# Patient Record
Sex: FEMALE | Race: OTHER | Employment: FULL TIME | RURAL
[De-identification: names, ages, dates, MRNs, and addresses within clinical notes are randomized per-mention and may not be internally consistent; named-entity substitution may affect disease eponyms.]

---

## 2020-08-13 ENCOUNTER — OFFICE VISIT (OUTPATIENT)
Dept: PRIMARY CARE CLINIC | Age: 25
End: 2020-08-13

## 2020-08-13 DIAGNOSIS — Z20.828 EXPOSURE TO SARS-ASSOCIATED CORONAVIRUS: Primary | ICD-10-CM

## 2020-08-15 LAB — SARS-COV-2, NAA: NOT DETECTED

## 2020-08-31 PROBLEM — Z87.39 H/O KAWASAKI'S DISEASE: Status: ACTIVE | Noted: 2017-08-01

## 2020-08-31 PROBLEM — J45.20 MILD INTERMITTENT ASTHMA WITHOUT COMPLICATION: Status: ACTIVE | Noted: 2020-08-31

## 2020-08-31 PROBLEM — Z80.8 FAMILY HISTORY OF THYROID CANCER: Status: ACTIVE | Noted: 2019-06-27

## 2020-08-31 PROBLEM — J30.9 ALLERGIC RHINITIS: Status: ACTIVE | Noted: 2017-08-01

## 2020-08-31 PROBLEM — E66.01 MORBID OBESITY (HCC): Status: ACTIVE | Noted: 2018-11-21

## 2020-08-31 PROBLEM — F41.1 GAD (GENERALIZED ANXIETY DISORDER): Status: ACTIVE | Noted: 2020-08-31

## 2021-06-22 ENCOUNTER — OFFICE VISIT (OUTPATIENT)
Dept: FAMILY MEDICINE CLINIC | Age: 26
End: 2021-06-22
Payer: COMMERCIAL

## 2021-06-22 VITALS
TEMPERATURE: 98.1 F | OXYGEN SATURATION: 98 % | BODY MASS INDEX: 46.42 KG/M2 | WEIGHT: 278.6 LBS | DIASTOLIC BLOOD PRESSURE: 80 MMHG | HEART RATE: 93 BPM | RESPIRATION RATE: 20 BRPM | HEIGHT: 65 IN | SYSTOLIC BLOOD PRESSURE: 138 MMHG

## 2021-06-22 DIAGNOSIS — Z11.3 ROUTINE SCREENING FOR STI (SEXUALLY TRANSMITTED INFECTION): ICD-10-CM

## 2021-06-22 DIAGNOSIS — Z11.59 SCREENING FOR VIRAL DISEASE: ICD-10-CM

## 2021-06-22 DIAGNOSIS — E55.9 VITAMIN D DEFICIENCY: ICD-10-CM

## 2021-06-22 DIAGNOSIS — Z11.1 PPD SCREENING TEST: Primary | ICD-10-CM

## 2021-06-22 PROCEDURE — 36415 COLL VENOUS BLD VENIPUNCTURE: CPT | Performed by: NURSE PRACTITIONER

## 2021-06-22 PROCEDURE — 99214 OFFICE O/P EST MOD 30 MIN: CPT | Performed by: NURSE PRACTITIONER

## 2021-06-22 NOTE — PROGRESS NOTES
Chief Complaint   Patient presents with    Employment Physical       HPI:    Dayton Diop is a 22 y.o. female who presents today for chronic care. Allison Aranda is a  at [employer]. 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     Hx anxiety: Diagnosed earlier this year but Allison Aranda reports she has had anxiety symptoms for her entire life. She was started on citalopram 20 mg in March and has felt some improvement. Stressors include work, social obligations. No longer on Celexa. Some anxiety lately with the sale of her house and a pending move but tolerable.     Hx Kawasaki disease: Dx at age 11, received 2 IVIG infusions. No longer seeing cardiology. Gets an echo every 2-3 years. Last done in 2019 at [facility] in [place], [place].     Allergic asthma: Controlled on Zyrtec, PRN albuterol. Uses albuterol about every 2-3 days. New issues: Needs tuberculosis screening test for her new job today. She also requests STI screening - sexually active but asymptomatic. She is on OCPs. Allergies   Allergen Reactions    Amoxil [Amoxicillin] Hives    Bactrim [Sulfamethoprim] Rash    Ceftriaxone Hives    Pcn [Penicillins] Rash       Current Outpatient Medications   Medication Sig    Sronyx 0.1-20 mg-mcg tab TAKE 1 TABLET BY MOUTH EVERY DAY    albuterol (PROVENTIL HFA, VENTOLIN HFA, PROAIR HFA) 90 mcg/actuation inhaler Take  by inhalation.  Cetirizine (ZyrTEC) 10 mg cap Take  by mouth. No current facility-administered medications for this visit. Past Medical History:   Diagnosis Date    Allergic rhinitis 8/1/2017    Last Assessment & Plan:  I resumed her generic Singulair 10 mg daily. Stop the Allegra and start Zyrtec 10 mg 1 pill every day. Refer to ENT through [facility] for evaluation. She will keep her appointment here with March with her PCP to recheck her allergy symptoms as well.     Family history of thyroid cancer 6/27/2019    JACQUELIN (generalized anxiety disorder) 8/31/2020    H/O Kawasaki's disease 8/1/2017    Mild intermittent asthma without complication 0/70/9443       No family history on file. ROS:  Denies fever, chills, cough, chest pain, SOB,  nausea, vomiting, diarrhea, dysuria. Denies rashes, wounds, arthralgias, weakness, numbness, visual changes, depression, + wt loss, wt gain, hemoptysis, hematochezia or melena. Patient is not experiencing chest pain radiating to the jaw and/or down the arms. Physical Examination:    /80 (BP 1 Location: Right arm, BP Patient Position: Sitting, BP Cuff Size: Large adult)   Pulse 93   Temp 98.1 °F (36.7 °C) (Temporal)   Resp 20   Ht 5' 5\" (1.651 m)   Wt 278 lb 9.6 oz (126.4 kg)   SpO2 98%   BMI 46.36 kg/m²     Wt Readings from Last 3 Encounters:   06/22/21 278 lb 9.6 oz (126.4 kg)   09/25/20 286 lb (129.7 kg)   09/04/20 272 lb 3.2 oz (123.5 kg)     Constitutional: WDWN Female in no acute distress  HENT:  NC/AT  EYES: EOMI, PERRL  Neck:  Supple, no JVD, mass or bruit. No thyromegaly. Respiratory:  Respirations even and unlabored without use of accessory muscles, CTA throughout without wheezes, rales, rubs or rhonchi. Symmetrical chest expansion. Upper airway congestion. Cardiac: RRR  Musculoskeletal:  No cyanosis, clubbing or edema of extremities. Moves all extremities without difficulty. Neurologic:  Smooth, even gait without assistance, CN 2-12 grossly intact. Skin: intact and warm to the touch, no rash   Lymphadenopathy: no cervical or supraclavicular nodes  Psych: Pleasant and appropriate. Judgment normal. Alert and oriented x 3. ASSESSMENT AND PLAN:       ICD-10-CM ICD-9-CM    1. PPD screening test  Z11.1 V74.1 COLLECTION VENOUS BLOOD,VENIPUNCTURE      QUANTIFERON-TB GOLD PLUS      COLLECTION VENOUS BLOOD,VENIPUNCTURE   2.  Screening for viral disease  Z11.59 V73.99 COLLECTION VENOUS BLOOD,VENIPUNCTURE      HEP B SURFACE AB      HCV AB W/RFLX TO GENESIS      HCV AB W/RFLX TO GENESIS      HEP B SURFACE AB      COLLECTION VENOUS BLOOD,VENIPUNCTURE   3. Routine screening for STI (sexually transmitted infection)  Z11.3 V74.5 COLLECTION VENOUS BLOOD,VENIPUNCTURE      CT/NG/T.VAGINALIS AMPLIFICATION      HIV 1/2 AG/AB, 4TH GENERATION,W RFLX CONFIRM      T PALLIDUM SCREEN W/REFLEX      HEP B SURFACE AB      HCV AB W/RFLX TO GENESIS      HCV AB W/RFLX TO GENESIS      HEP B SURFACE AB      T PALLIDUM SCREEN W/REFLEX      HIV 1/2 AG/AB, 4TH GENERATION,W RFLX CONFIRM      CT/NG/T.VAGINALIS AMPLIFICATION      COLLECTION VENOUS BLOOD,VENIPUNCTURE   4. Vitamin D deficiency  E55.9 268.9 COLLECTION VENOUS BLOOD,VENIPUNCTURE      VITAMIN D, 25 HYDROXY      VITAMIN D, 25 HYDROXY      COLLECTION VENOUS BLOOD,VENIPUNCTURE     STI screening today. Quantiferon Gold also drawn to assess for TB. Hx vitamin D deficiency, re-check levels today. Patient aware of plan of care and verbalized understanding. Questions answered. RTC annually, or sooner if needed.     Shiv Rosales NP

## 2021-06-22 NOTE — PATIENT INSTRUCTIONS
Learning About Vitamin D  Why is it important to get enough vitamin D? Your body needs vitamin D to absorb calcium. Calcium keeps your bones and muscles, including your heart, healthy and strong. If your muscles don't get enough calcium, they can cramp, hurt, or feel weak. You may have long-term (chronic) muscle aches and pains. If you don't get enough vitamin D throughout life, you have an increased chance of having thin and brittle bones (osteoporosis) in your later years. Children who don't get enough vitamin D may not grow as much as others their age. They also have a chance of getting a rare disease called rickets. It causes weak bones. Vitamin D and calcium are added to many foods. And your body uses sunshine to make its own vitamin D. How much vitamin D do you need? The recommended daily allowance (RDA) for vitamin D is 600 IU (international units) every day for people ages 3 through 79. Adults 71 and older need 800 IU every day. Blood tests for vitamin D can check your vitamin D level. But there is no standard normal range used by all laboratories. You're likely getting enough vitamin D if your levels are in the range of 20 to 50 ng/mL. How can you get more vitamin D? Foods that contain vitamin D include:  · [place], tuna, and mackerel. These are some of the best foods to eat when you need to get more vitamin D.  · Cheese, egg yolks, and beef liver. These foods have vitamin D in small amounts. · Milk, soy drinks, orange juice, yogurt, margarine, and some kinds of cereal have vitamin D added to them. Some people don't make vitamin D as well as others. They may have to take extra care in getting enough vitamin D. Things that reduce how much vitamin D your body makes include:  · Dark skin, such as many  Americans have. · Age, especially if you are older than 72. · Digestive problems, such as Crohn's or celiac disease. · Liver and kidney disease.   Some people who do not get enough vitamin D may need supplements. Are there any risks from taking vitamin D?  · Too much vitamin D:  ? Can damage your kidneys. ? Can cause nausea and vomiting, constipation, and weakness. ? Raises the amount of calcium in your blood. If this happens, you can get confused or have an irregular heart rhythm. · Vitamin D may interact with other medicines. Tell your doctor about all of the medicines you take, including over-the-counter drugs, herbs, and pills. Tell your doctor about all of your current medical problems. Where can you learn more? Go to http://www.max.com/  Enter V530 in the search box to learn more about \"Learning About Vitamin D.\"  Current as of: December 17, 2020               Content Version: 12.8  © 2006-2021 Healthwise, [employer]. Care instructions adapted under license by [employer] (which disclaims liability or warranty for this information). If you have questions about a medical condition or this instruction, always ask your healthcare professional. [place] [address] any warranty or liability for your use of this information.

## 2021-06-25 ENCOUNTER — PATIENT MESSAGE (OUTPATIENT)
Dept: FAMILY MEDICINE CLINIC | Age: 26
End: 2021-06-25

## 2021-06-25 LAB
25(OH)D3+25(OH)D2 SERPL-MCNC: 25.5 NG/ML (ref 30–100)
C TRACH RRNA SPEC QL NAA+PROBE: NEGATIVE
GAMMA INTERFERON BACKGROUND BLD IA-ACNC: 0 IU/ML
HBV SURFACE AB SER QL: NON REACTIVE
HCV AB S/CO SERPL IA: <0.1 S/CO RATIO (ref 0–0.9)
HCV AB SERPL QL IA: NORMAL
HIV 1+2 AB+HIV1 P24 AG SERPL QL IA: NON REACTIVE
M TB IFN-G BLD-IMP: NEGATIVE
M TB IFN-G CD4+ BCKGRND COR BLD-ACNC: 0 IU/ML
MITOGEN IGNF BLD-ACNC: 2.35 IU/ML
N GONORRHOEA RRNA SPEC QL NAA+PROBE: NEGATIVE
QUANTIFERON INCUBATION, QF1T: NORMAL
QUANTIFERON TB2 AG: 0 IU/ML
SERVICE CMNT-IMP: NORMAL
SPECIMEN STATUS REPORT, ROLRST: NORMAL
T VAGINALIS DNA SPEC QL NAA+PROBE: NEGATIVE
TREPONEMA PALLIDUM IGG+IGM AB [PRESENCE] IN SERUM OR PLASMA BY IMMUNOASSAY: NON REACTIVE

## 2022-03-18 PROBLEM — J30.9 ALLERGIC RHINITIS: Status: ACTIVE | Noted: 2017-08-01

## 2022-03-18 PROBLEM — F41.1 GAD (GENERALIZED ANXIETY DISORDER): Status: ACTIVE | Noted: 2020-08-31

## 2022-03-18 PROBLEM — Z87.39 H/O KAWASAKI'S DISEASE: Status: ACTIVE | Noted: 2017-08-01

## 2022-03-19 PROBLEM — Z80.8 FAMILY HISTORY OF THYROID CANCER: Status: ACTIVE | Noted: 2019-06-27

## 2022-03-19 PROBLEM — J45.20 MILD INTERMITTENT ASTHMA WITHOUT COMPLICATION: Status: ACTIVE | Noted: 2020-08-31

## 2022-03-19 PROBLEM — E66.01 MORBID OBESITY (HCC): Status: ACTIVE | Noted: 2018-11-21

## 2024-03-13 ENCOUNTER — HOSPITAL ENCOUNTER (OUTPATIENT)
Facility: HOSPITAL | Age: 29
Discharge: HOME | End: 2024-03-16
Payer: COMMERCIAL

## 2024-03-13 ENCOUNTER — HOSPITAL ENCOUNTER (OUTPATIENT)
Facility: HOSPITAL | Age: 29
Discharge: HOME | End: 2024-03-16

## 2024-03-13 DIAGNOSIS — Z01.818 PRE-OP TESTING: ICD-10-CM

## 2024-03-13 LAB
EKG ATRIAL RATE: 99 BPM
EKG DIAGNOSIS: NORMAL
EKG P AXIS: 68 DEGREES
EKG P-R INTERVAL: 160 MS
EKG Q-T INTERVAL: 356 MS
EKG QRS DURATION: 78 MS
EKG QTC CALCULATION (BAZETT): 456 MS
EKG R AXIS: 79 DEGREES
EKG T AXIS: 58 DEGREES
EKG VENTRICULAR RATE: 99 BPM
SENTARA SPECIMEN COLLECTION: NORMAL

## 2024-03-13 PROCEDURE — 93005 ELECTROCARDIOGRAM TRACING: CPT | Performed by: PODIATRIST

## 2024-03-13 PROCEDURE — 99001 SPECIMEN HANDLING PT-LAB: CPT

## 2024-03-14 LAB
ANION GAP SERPL CALCULATED.3IONS-SCNC: 11 MMOL/L (ref 3–15)
BUN BLDV-MCNC: 8 MG/DL (ref 6–22)
CALCIUM SERPL-MCNC: 9.1 MG/DL (ref 8.4–10.5)
CHLORIDE BLD-SCNC: 98 MMOL/L (ref 98–110)
CO2: 26 MMOL/L (ref 20–32)
CREAT SERPL-MCNC: 0.6 MG/DL (ref 0.5–1.2)
GLOMERULAR FILTRATION RATE: >60 ML/MIN/1.73 SQ.M.
GLUCOSE: 69 MG/DL (ref 70–99)
HCT VFR BLD CALC: 43.3 % (ref 35.1–46.5)
HEMOGLOBIN: 13 G/DL (ref 11.7–15.5)
POTASSIUM SERPL-SCNC: 3.7 MMOL/L (ref 3.5–5.5)
SODIUM BLD-SCNC: 135 MMOL/L (ref 133–145)

## 2024-03-18 ENCOUNTER — ANESTHESIA EVENT (OUTPATIENT)
Facility: HOSPITAL | Age: 29
End: 2024-03-18
Payer: COMMERCIAL

## 2024-03-19 ENCOUNTER — HOSPITAL ENCOUNTER (OUTPATIENT)
Facility: HOSPITAL | Age: 29
Setting detail: OUTPATIENT SURGERY
Discharge: HOME | End: 2024-03-19
Attending: PODIATRIST | Admitting: PODIATRIST
Payer: COMMERCIAL

## 2024-03-19 ENCOUNTER — ANESTHESIA (OUTPATIENT)
Facility: HOSPITAL | Age: 29
End: 2024-03-19
Payer: COMMERCIAL

## 2024-03-19 VITALS
WEIGHT: 293 LBS | BODY MASS INDEX: 48.82 KG/M2 | HEIGHT: 65 IN | TEMPERATURE: 98.7 F | DIASTOLIC BLOOD PRESSURE: 69 MMHG | HEART RATE: 96 BPM | RESPIRATION RATE: 16 BRPM | OXYGEN SATURATION: 100 % | SYSTOLIC BLOOD PRESSURE: 135 MMHG

## 2024-03-19 LAB — HCG UR QL: NEGATIVE

## 2024-03-19 PROCEDURE — 6360000002 HC RX W HCPCS: Performed by: PODIATRIST

## 2024-03-19 PROCEDURE — 6360000002 HC RX W HCPCS: Performed by: NURSE ANESTHETIST, CERTIFIED REGISTERED

## 2024-03-19 PROCEDURE — 2500000003 HC RX 250 WO HCPCS: Performed by: PODIATRIST

## 2024-03-19 PROCEDURE — 2709999900 HC NON-CHARGEABLE SUPPLY: Performed by: PODIATRIST

## 2024-03-19 PROCEDURE — 2580000003 HC RX 258: Performed by: PODIATRIST

## 2024-03-19 PROCEDURE — 81025 URINE PREGNANCY TEST: CPT

## 2024-03-19 PROCEDURE — 2500000003 HC RX 250 WO HCPCS: Performed by: NURSE ANESTHETIST, CERTIFIED REGISTERED

## 2024-03-19 PROCEDURE — 3700000000 HC ANESTHESIA ATTENDED CARE: Performed by: PODIATRIST

## 2024-03-19 PROCEDURE — 3600000012 HC SURGERY LEVEL 2 ADDTL 15MIN: Performed by: PODIATRIST

## 2024-03-19 PROCEDURE — 3700000001 HC ADD 15 MINUTES (ANESTHESIA): Performed by: PODIATRIST

## 2024-03-19 PROCEDURE — 6370000000 HC RX 637 (ALT 250 FOR IP): Performed by: STUDENT IN AN ORGANIZED HEALTH CARE EDUCATION/TRAINING PROGRAM

## 2024-03-19 PROCEDURE — 7100000010 HC PHASE II RECOVERY - FIRST 15 MIN: Performed by: PODIATRIST

## 2024-03-19 PROCEDURE — 7100000011 HC PHASE II RECOVERY - ADDTL 15 MIN: Performed by: PODIATRIST

## 2024-03-19 PROCEDURE — 3600000002 HC SURGERY LEVEL 2 BASE: Performed by: PODIATRIST

## 2024-03-19 RX ORDER — SODIUM CHLORIDE 0.9 % (FLUSH) 0.9 %
5-40 SYRINGE (ML) INJECTION PRN
Status: CANCELLED | OUTPATIENT
Start: 2024-03-19

## 2024-03-19 RX ORDER — NALOXONE HYDROCHLORIDE 0.4 MG/ML
INJECTION, SOLUTION INTRAMUSCULAR; INTRAVENOUS; SUBCUTANEOUS PRN
Status: CANCELLED | OUTPATIENT
Start: 2024-03-19

## 2024-03-19 RX ORDER — HYDROMORPHONE HYDROCHLORIDE 1 MG/ML
0.25 INJECTION, SOLUTION INTRAMUSCULAR; INTRAVENOUS; SUBCUTANEOUS EVERY 5 MIN PRN
Status: CANCELLED | OUTPATIENT
Start: 2024-03-19

## 2024-03-19 RX ORDER — OXYCODONE HYDROCHLORIDE 5 MG/1
5 TABLET ORAL PRN
Status: CANCELLED | OUTPATIENT
Start: 2024-03-19 | End: 2024-03-19

## 2024-03-19 RX ORDER — SODIUM CHLORIDE, SODIUM LACTATE, POTASSIUM CHLORIDE, CALCIUM CHLORIDE 600; 310; 30; 20 MG/100ML; MG/100ML; MG/100ML; MG/100ML
INJECTION, SOLUTION INTRAVENOUS CONTINUOUS
Status: DISCONTINUED | OUTPATIENT
Start: 2024-03-19 | End: 2024-03-19 | Stop reason: HOSPADM

## 2024-03-19 RX ORDER — SODIUM CHLORIDE 9 MG/ML
INJECTION, SOLUTION INTRAVENOUS PRN
Status: CANCELLED | OUTPATIENT
Start: 2024-03-19

## 2024-03-19 RX ORDER — LIDOCAINE HYDROCHLORIDE 20 MG/ML
INJECTION, SOLUTION EPIDURAL; INFILTRATION; INTRACAUDAL; PERINEURAL PRN
Status: DISCONTINUED | OUTPATIENT
Start: 2024-03-19 | End: 2024-03-19 | Stop reason: SDUPTHER

## 2024-03-19 RX ORDER — CLINDAMYCIN PHOSPHATE 900 MG/50ML
900 INJECTION, SOLUTION INTRAVENOUS ONCE
Status: COMPLETED | OUTPATIENT
Start: 2024-03-19 | End: 2024-03-19

## 2024-03-19 RX ORDER — MEPERIDINE HYDROCHLORIDE 50 MG/ML
12.5 INJECTION INTRAMUSCULAR; INTRAVENOUS; SUBCUTANEOUS EVERY 5 MIN PRN
Status: CANCELLED | OUTPATIENT
Start: 2024-03-19

## 2024-03-19 RX ORDER — ONDANSETRON 2 MG/ML
4 INJECTION INTRAMUSCULAR; INTRAVENOUS
Status: CANCELLED | OUTPATIENT
Start: 2024-03-19 | End: 2024-03-20

## 2024-03-19 RX ORDER — SODIUM CHLORIDE 0.9 % (FLUSH) 0.9 %
5-40 SYRINGE (ML) INJECTION EVERY 12 HOURS SCHEDULED
Status: CANCELLED | OUTPATIENT
Start: 2024-03-19

## 2024-03-19 RX ORDER — MIDAZOLAM HYDROCHLORIDE 1 MG/ML
INJECTION INTRAMUSCULAR; INTRAVENOUS PRN
Status: DISCONTINUED | OUTPATIENT
Start: 2024-03-19 | End: 2024-03-19 | Stop reason: SDUPTHER

## 2024-03-19 RX ORDER — ONDANSETRON 2 MG/ML
INJECTION INTRAMUSCULAR; INTRAVENOUS PRN
Status: DISCONTINUED | OUTPATIENT
Start: 2024-03-19 | End: 2024-03-19 | Stop reason: SDUPTHER

## 2024-03-19 RX ORDER — PROCHLORPERAZINE EDISYLATE 5 MG/ML
5 INJECTION INTRAMUSCULAR; INTRAVENOUS
Status: CANCELLED | OUTPATIENT
Start: 2024-03-19 | End: 2024-03-20

## 2024-03-19 RX ORDER — OXYCODONE HYDROCHLORIDE 5 MG/1
10 TABLET ORAL PRN
Status: CANCELLED | OUTPATIENT
Start: 2024-03-19 | End: 2024-03-19

## 2024-03-19 RX ORDER — SODIUM CHLORIDE 9 MG/ML
INJECTION, SOLUTION INTRAVENOUS CONTINUOUS
Status: CANCELLED | OUTPATIENT
Start: 2024-03-19

## 2024-03-19 RX ORDER — SCOLOPAMINE TRANSDERMAL SYSTEM 1 MG/1
1 PATCH, EXTENDED RELEASE TRANSDERMAL
Status: DISCONTINUED | OUTPATIENT
Start: 2024-03-19 | End: 2024-03-19 | Stop reason: HOSPADM

## 2024-03-19 RX ORDER — PROPOFOL 10 MG/ML
INJECTION, EMULSION INTRAVENOUS PRN
Status: DISCONTINUED | OUTPATIENT
Start: 2024-03-19 | End: 2024-03-19 | Stop reason: SDUPTHER

## 2024-03-19 RX ORDER — FENTANYL CITRATE 50 UG/ML
50 INJECTION, SOLUTION INTRAMUSCULAR; INTRAVENOUS EVERY 5 MIN PRN
Status: CANCELLED | OUTPATIENT
Start: 2024-03-19

## 2024-03-19 RX ORDER — KETAMINE HCL IN NACL, ISO-OSM 100MG/10ML
SYRINGE (ML) INJECTION PRN
Status: DISCONTINUED | OUTPATIENT
Start: 2024-03-19 | End: 2024-03-19 | Stop reason: SDUPTHER

## 2024-03-19 RX ADMIN — Medication 10 MG: at 16:33

## 2024-03-19 RX ADMIN — Medication 10 MG: at 16:35

## 2024-03-19 RX ADMIN — MIDAZOLAM 2 MG: 1 INJECTION INTRAMUSCULAR; INTRAVENOUS at 16:24

## 2024-03-19 RX ADMIN — LIDOCAINE HYDROCHLORIDE 60 MG: 20 INJECTION, SOLUTION EPIDURAL; INFILTRATION; INTRACAUDAL; PERINEURAL at 16:30

## 2024-03-19 RX ADMIN — PROPOFOL 100 MG: 10 INJECTION, EMULSION INTRAVENOUS at 16:30

## 2024-03-19 RX ADMIN — Medication 10 MG: at 16:37

## 2024-03-19 RX ADMIN — SODIUM CHLORIDE, POTASSIUM CHLORIDE, SODIUM LACTATE AND CALCIUM CHLORIDE: 600; 310; 30; 20 INJECTION, SOLUTION INTRAVENOUS at 12:35

## 2024-03-19 RX ADMIN — PROPOFOL 100 MCG/KG/MIN: 10 INJECTION, EMULSION INTRAVENOUS at 16:33

## 2024-03-19 RX ADMIN — CLINDAMYCIN PHOSPHATE 900 MG: 900 INJECTION, SOLUTION INTRAVENOUS at 16:30

## 2024-03-19 RX ADMIN — ONDANSETRON HYDROCHLORIDE 4 MG: 2 INJECTION INTRAMUSCULAR; INTRAVENOUS at 16:38

## 2024-03-19 ASSESSMENT — PAIN - FUNCTIONAL ASSESSMENT
PAIN_FUNCTIONAL_ASSESSMENT: 0-10
PAIN_FUNCTIONAL_ASSESSMENT: NONE - DENIES PAIN
PAIN_FUNCTIONAL_ASSESSMENT: NONE - DENIES PAIN

## 2024-03-19 NOTE — PERIOP NOTE
TRANSFER - IN REPORT:    Verbal report received from Park CEDILLO on Lulu Benjamin  being received from OR for routine post-op      Report consisted of patient's Situation, Background, Assessment and   Recommendations(SBAR).     Information from the following report(s) Nurse Handoff Report, Surgery Report, Intake/Output, MAR, and Recent Results was reviewed with the receiving nurse.    Opportunity for questions and clarification was provided.      Assessment completed upon patient's arrival to unit and care assumed.

## 2024-03-19 NOTE — ANESTHESIA PRE PROCEDURE
> = 3 FB   Dental:          Pulmonary:normal exam  breath sounds clear to auscultation      (-) asthma                          ROS comment: Hx of subglottic stenosis previously dx as asthma, underwent dilation 2023   Cardiovascular:  Exercise tolerance: good (>4 METS)          Rhythm: regular  Rate: normal           Beta Blocker:  Not on Beta Blocker         Neuro/Psych:   (+) psychiatric history:            GI/Hepatic/Renal:   (+) morbid obesity          Endo/Other: Negative Endo/Other ROS                    Abdominal:   (+) obese          Vascular:           ROS comment: Kawasakis dz - as a child with high fever and hand/feet swelling. Other Findings:             Anesthesia Plan      MAC     ASA 3       Induction: intravenous.  continuous noninvasive hemodynamic monitor    Anesthetic plan and risks discussed with patient.      Plan discussed with CRNA.                    Jose Foote MD   3/19/2024

## 2024-03-19 NOTE — ANESTHESIA POSTPROCEDURE EVALUATION
Department of Anesthesiology  Postprocedure Note    Patient: Lulu Benjamin  MRN: [id number]  YOB: 1995  Date of evaluation: 3/19/2024    Procedure Summary       Date: 03/19/24 Room / Location: [facility] MAIN 06 / [facility] MAIN OR    Anesthesia Start: 1624 Anesthesia Stop: 1701    Procedure: RIGHT GREAT TOE CONDYLECTOMY, EXCISION OF NAIL AND NAIL MATRIX (Right: Foot) Diagnosis:       Ingrowing nail      Pain in metatarsus of right foot      (Ingrowing nail [L60.0])      (Pain in metatarsus of right foot [M89.8X7])    Surgeons: Ismael Simpson DPM Responsible Provider: Carlos Patterson MD    Anesthesia Type: MAC ASA Status: 3            Anesthesia Type: MAC    Karla Phase I:      Karla Phase II: Karla Score: 9    Anesthesia Post Evaluation    Patient location during evaluation: bedside  Patient participation: complete - patient participated  Level of consciousness: awake  Airway patency: patent  Nausea & Vomiting: no nausea and no vomiting  Cardiovascular status: hemodynamically stable  Respiratory status: acceptable  Hydration status: stable  Pain management: satisfactory to patient    No notable events documented.

## 2024-03-19 NOTE — BRIEF OP NOTE
Brief Postoperative Note      Patient: Lulu Benjamin  YOB: 1995  MRN: [id number]    Date of Procedure: 3/19/2024    Pre-Op Diagnosis Codes:     * Ingrowing nail [L60.0]     * Pain in metatarsus of right foot [M89.8X7]    Post-Op Diagnosis: Same       Procedure(s):  RIGHT GREAT TOE CONDYLECTOMY, EXCISION OF NAIL AND NAIL MATRIX    Surgeon(s):  Pablo Rico DPM    Assistant:  Surgical Assistant: Teodora Butler    Anesthesia: Monitor Anesthesia Care    Estimated Blood Loss (mL): Minimal    Complications: None    Specimens:   * No specimens in log *    Implants:  * No implants in log *      Drains: * No LDAs found *    Findings: None      Electronically signed by PABLO RICO DPM on 3/19/2024 at 4:53 PM

## 2024-03-19 NOTE — PERIOP NOTE
Education given to patient and family member,opportunity for questions and concerns given. Patient and family member verbalize understanding.

## 2024-03-19 NOTE — PERIOP NOTE
Patient assisted to the restroom and back to stretcher without incident.  Call bell within reach and no further needs at this time.    Patient and family updated about current delay. Patient verbalize understanding.

## 2024-03-19 NOTE — DISCHARGE INSTRUCTIONS
experience any of the following symptoms:  Weight gain of 3 pounds or more overnight or 5 pounds in a week, increased swelling in our hands or feet or shortness of breath while lying flat in bed.  Please call your doctor as soon as you notice any of these symptoms; do not wait until your next office visit.     Patient armband removed and shredded     The discharge information has been reviewed with the patient and caregiver.  The patient and caregiver verbalized understanding.  Discharge medications reviewed with the patient and caregiver and appropriate educational materials and side effects teaching were provided.           Nausea and Vomiting After Surgery: Care Instructions  Your Care Instructions     After you've had surgery, you may feel sick to your stomach (nauseated) or you may vomit. Sometimes anesthesia can make you feel sick. It's a common side effect and often doesn't last long. Pain also can make you feel sick or vomit. After the anesthesia wears off, you may feel pain from the incision (cut). That pain could then upset your stomach. Taking pain medicine can also make you feel sick to your stomach.  Whatever the cause, you may get medicine that can help. There are also some things you can do at home to prevent nausea and feel better.  The doctor has checked you carefully, but problems can develop later. If you notice any problems or new symptoms, get medical treatment right away.  Follow-up care is a key part of your treatment and safety. Be sure to make and go to all appointments, and call your doctor if you are having problems. It's also a good idea to know your test results and keep a list of the medicines you take.  How can you care for yourself at home?  Take your pain medicine as soon as you have pain. It works better if you take it before the pain gets bad.  Call your doctor if you have any problems with your medicine.  To prevent dehydration, drink plenty of fluids. Choose water and other clear

## 2024-03-19 NOTE — INTERVAL H&P NOTE
Update History & Physical    The patient's History and Physical of February 27, 2024 was reviewed with the patient and I examined the patient. There was no change. The surgical site was confirmed by the patient and me.     Plan: The risks, benefits, expected outcome, and alternative to the recommended procedure have been discussed with the patient. Patient understands and wants to proceed with the procedure.     Electronically signed by PABOL RICO DPM on 3/19/2024 at 3:42 PM

## 2024-03-19 NOTE — PERIOP NOTE
Urine pregnancy results negative and verified with Yue.  Reviewed PTA medication list with patient/caregiver and patient/caregiver denies any additional medications.     Patient admits to having a responsible adult care for them at home for at least 24 hours after surgery.    Patient encouraged to use gown warming system and informed that using said warming gown to regulate body temperature prior to a procedure has been shown to help reduce the risks of blood clots and infection.    Patient's pharmacy of choice verified and documented in PTA medication section.    Dual skin assessment & fall risk band verification completed with Yue CEDILLO.

## 2024-03-20 NOTE — OP NOTE
[address]  [address]                            OPERATIVE REPORT      PATIENT NAME: TOO GUADARRAMA             : 1995  MED REC NO: [id number]                       ROOM: OR  ACCOUNT NO: [id number]                       ADMIT DATE: 2024  PROVIDER: Ismael Simpson DPM    DATE OF SERVICE:      PREOPERATIVE DIAGNOSES:       1. Recurrent ingrowing nail, right foot.     2. Painful exostosis, great toe.    POSTOPERATIVE DIAGNOSES:       1. Recurrent ingrowing nail, right foot.     2. Painful exostosis, great toe.    PROCEDURES PERFORMED:       1. Excision of nail and nail matrix, right.     2. Resection of hypertrophic condyle distal phalanx, right great toe.    SURGEON:  Ismael Simpson DPM    ASSISTANT:  ***    ANESTHESIA:  MAC.    ESTIMATED BLOOD LOSS:  ***    SPECIMENS REMOVED:  ***    INTRAOPERATIVE FINDINGS:  ***     COMPLICATIONS:  ***    IMPLANTS:  ***    INDICATIONS:  This is a very pleasant 28-year-old female, who has had ongoing pain and discomfort associated with a recurrent hallux toenail of the right foot.  The toenail is recurrent, painful despite several attempts at nail excision.  We are here today to remove all of the nail with nail matrix, hypertrophic condyle, any surrounding hypertrophic nail, which may be present.  She understands risks including delayed healing, nonhealing, continued recurrence, and need for possible complete toenail removal.  No guarantees were given.  Informed consent was obtained.    DESCRIPTION OF PROCEDURE:  The patient was taken to the operating room, placed on the operating table in a supine position.  After induction of IV sedation and administration of IV sedation, a well-padded pneumatic ankle tourniquet was applied and the patient's right foot was injected with 8 mL of 0.5% Marcaine plain.  The right foot was then prepped and draped in usual sterile manner.  It was

## 2024-09-20 ENCOUNTER — HOSPITAL ENCOUNTER (OUTPATIENT)
Facility: HOSPITAL | Age: 29
Discharge: HOME | End: 2024-09-23

## 2024-09-20 ENCOUNTER — HOSPITAL ENCOUNTER (OUTPATIENT)
Facility: HOSPITAL | Age: 29
Discharge: HOME | End: 2024-09-23
Payer: COMMERCIAL

## 2024-09-20 DIAGNOSIS — Z01.818 PRE-OP TESTING: ICD-10-CM

## 2024-09-20 LAB — SENTARA SPECIMEN COLLECTION: NORMAL

## 2024-09-20 PROCEDURE — 99001 SPECIMEN HANDLING PT-LAB: CPT

## 2024-09-20 PROCEDURE — 93005 ELECTROCARDIOGRAM TRACING: CPT | Performed by: PODIATRIST

## 2024-09-21 LAB
ANION GAP SERPL CALCULATED.3IONS-SCNC: 12 MMOL/L (ref 3–15)
BUN BLDV-MCNC: 7 MG/DL (ref 6–22)
CALCIUM SERPL-MCNC: 9.8 MG/DL (ref 8.4–10.5)
CHLORIDE BLD-SCNC: 100 MMOL/L (ref 98–110)
CO2: 27 MMOL/L (ref 20–32)
CREAT SERPL-MCNC: 0.7 MG/DL (ref 0.5–1.2)
GFR, ESTIMATED: >60 ML/MIN/1.73 SQ.M.
GLUCOSE: 82 MG/DL (ref 70–99)
HCT VFR BLD CALC: 41.4 % (ref 35.1–46.5)
HEMOGLOBIN: 12.9 G/DL (ref 11.7–15.5)
POTASSIUM SERPL-SCNC: 4.1 MMOL/L (ref 3.5–5.5)
SODIUM BLD-SCNC: 139 MMOL/L (ref 133–145)

## 2024-09-23 LAB
EKG ATRIAL RATE: 87 BPM
EKG DIAGNOSIS: NORMAL
EKG P AXIS: 59 DEGREES
EKG P-R INTERVAL: 156 MS
EKG Q-T INTERVAL: 354 MS
EKG QRS DURATION: 74 MS
EKG QTC CALCULATION (BAZETT): 425 MS
EKG R AXIS: 79 DEGREES
EKG T AXIS: 41 DEGREES
EKG VENTRICULAR RATE: 87 BPM

## 2024-09-26 ENCOUNTER — HOSPITAL ENCOUNTER (OUTPATIENT)
Facility: HOSPITAL | Age: 29
Setting detail: OUTPATIENT SURGERY
Discharge: HOME | End: 2024-09-26
Attending: PODIATRIST | Admitting: PODIATRIST
Payer: COMMERCIAL

## 2024-09-26 ENCOUNTER — ANESTHESIA EVENT (OUTPATIENT)
Facility: HOSPITAL | Age: 29
End: 2024-09-26
Payer: COMMERCIAL

## 2024-09-26 ENCOUNTER — ANESTHESIA (OUTPATIENT)
Facility: HOSPITAL | Age: 29
End: 2024-09-26
Payer: COMMERCIAL

## 2024-09-26 VITALS
TEMPERATURE: 97 F | HEART RATE: 77 BPM | SYSTOLIC BLOOD PRESSURE: 138 MMHG | BODY MASS INDEX: 46.87 KG/M2 | DIASTOLIC BLOOD PRESSURE: 77 MMHG | HEIGHT: 65 IN | OXYGEN SATURATION: 99 % | RESPIRATION RATE: 16 BRPM | WEIGHT: 281.3 LBS

## 2024-09-26 DIAGNOSIS — Z01.818 PRE-OP TESTING: Primary | ICD-10-CM

## 2024-09-26 LAB — HCG UR QL: NEGATIVE

## 2024-09-26 PROCEDURE — 3600000002 HC SURGERY LEVEL 2 BASE: Performed by: PODIATRIST

## 2024-09-26 PROCEDURE — A4217 STERILE WATER/SALINE, 500 ML: HCPCS | Performed by: PODIATRIST

## 2024-09-26 PROCEDURE — 6360000002 HC RX W HCPCS

## 2024-09-26 PROCEDURE — 3600000012 HC SURGERY LEVEL 2 ADDTL 15MIN: Performed by: PODIATRIST

## 2024-09-26 PROCEDURE — 7100000010 HC PHASE II RECOVERY - FIRST 15 MIN: Performed by: PODIATRIST

## 2024-09-26 PROCEDURE — 3700000001 HC ADD 15 MINUTES (ANESTHESIA): Performed by: PODIATRIST

## 2024-09-26 PROCEDURE — 7100000001 HC PACU RECOVERY - ADDTL 15 MIN: Performed by: PODIATRIST

## 2024-09-26 PROCEDURE — 6360000002 HC RX W HCPCS: Performed by: PODIATRIST

## 2024-09-26 PROCEDURE — 3700000000 HC ANESTHESIA ATTENDED CARE: Performed by: PODIATRIST

## 2024-09-26 PROCEDURE — 2500000003 HC RX 250 WO HCPCS

## 2024-09-26 PROCEDURE — 2709999900 HC NON-CHARGEABLE SUPPLY: Performed by: PODIATRIST

## 2024-09-26 PROCEDURE — 7100000000 HC PACU RECOVERY - FIRST 15 MIN: Performed by: PODIATRIST

## 2024-09-26 PROCEDURE — 81025 URINE PREGNANCY TEST: CPT

## 2024-09-26 PROCEDURE — 2580000003 HC RX 258: Performed by: PODIATRIST

## 2024-09-26 PROCEDURE — 7100000011 HC PHASE II RECOVERY - ADDTL 15 MIN: Performed by: PODIATRIST

## 2024-09-26 RX ORDER — FENTANYL CITRATE 50 UG/ML
25 INJECTION, SOLUTION INTRAMUSCULAR; INTRAVENOUS EVERY 5 MIN PRN
Status: DISCONTINUED | OUTPATIENT
Start: 2024-09-26 | End: 2024-09-26 | Stop reason: HOSPADM

## 2024-09-26 RX ORDER — OXYCODONE HYDROCHLORIDE 5 MG/1
5 TABLET ORAL
Status: DISCONTINUED | OUTPATIENT
Start: 2024-09-26 | End: 2024-09-26 | Stop reason: HOSPADM

## 2024-09-26 RX ORDER — BUPIVACAINE HYDROCHLORIDE 5 MG/ML
INJECTION, SOLUTION EPIDURAL; INTRACAUDAL PRN
Status: DISCONTINUED | OUTPATIENT
Start: 2024-09-26 | End: 2024-09-26 | Stop reason: ALTCHOICE

## 2024-09-26 RX ORDER — ONDANSETRON 2 MG/ML
4 INJECTION INTRAMUSCULAR; INTRAVENOUS
Status: DISCONTINUED | OUTPATIENT
Start: 2024-09-26 | End: 2024-09-26 | Stop reason: HOSPADM

## 2024-09-26 RX ORDER — LIDOCAINE HYDROCHLORIDE 20 MG/ML
INJECTION, SOLUTION INTRAVENOUS
Status: DISCONTINUED | OUTPATIENT
Start: 2024-09-26 | End: 2024-09-26 | Stop reason: SDUPTHER

## 2024-09-26 RX ORDER — MAGNESIUM HYDROXIDE 1200 MG/15ML
LIQUID ORAL CONTINUOUS PRN
Status: COMPLETED | OUTPATIENT
Start: 2024-09-26 | End: 2024-09-26

## 2024-09-26 RX ORDER — MIDAZOLAM HYDROCHLORIDE 1 MG/ML
INJECTION INTRAMUSCULAR; INTRAVENOUS
Status: DISCONTINUED | OUTPATIENT
Start: 2024-09-26 | End: 2024-09-26 | Stop reason: SDUPTHER

## 2024-09-26 RX ORDER — SODIUM CHLORIDE, SODIUM LACTATE, POTASSIUM CHLORIDE, CALCIUM CHLORIDE 600; 310; 30; 20 MG/100ML; MG/100ML; MG/100ML; MG/100ML
INJECTION, SOLUTION INTRAVENOUS CONTINUOUS
Status: DISCONTINUED | OUTPATIENT
Start: 2024-09-26 | End: 2024-09-26 | Stop reason: HOSPADM

## 2024-09-26 RX ORDER — SODIUM CHLORIDE 0.9 % (FLUSH) 0.9 %
5-40 SYRINGE (ML) INJECTION EVERY 12 HOURS SCHEDULED
Status: DISCONTINUED | OUTPATIENT
Start: 2024-09-26 | End: 2024-09-26 | Stop reason: HOSPADM

## 2024-09-26 RX ORDER — DROPERIDOL 2.5 MG/ML
0.62 INJECTION, SOLUTION INTRAMUSCULAR; INTRAVENOUS
Status: DISCONTINUED | OUTPATIENT
Start: 2024-09-26 | End: 2024-09-26 | Stop reason: HOSPADM

## 2024-09-26 RX ORDER — DIPHENHYDRAMINE HYDROCHLORIDE 50 MG/ML
12.5 INJECTION INTRAMUSCULAR; INTRAVENOUS
Status: DISCONTINUED | OUTPATIENT
Start: 2024-09-26 | End: 2024-09-26 | Stop reason: HOSPADM

## 2024-09-26 RX ORDER — SODIUM CHLORIDE 9 MG/ML
INJECTION, SOLUTION INTRAVENOUS PRN
Status: DISCONTINUED | OUTPATIENT
Start: 2024-09-26 | End: 2024-09-26 | Stop reason: HOSPADM

## 2024-09-26 RX ORDER — HYDROMORPHONE HYDROCHLORIDE 1 MG/ML
0.25 INJECTION, SOLUTION INTRAMUSCULAR; INTRAVENOUS; SUBCUTANEOUS EVERY 5 MIN PRN
Status: DISCONTINUED | OUTPATIENT
Start: 2024-09-26 | End: 2024-09-26 | Stop reason: HOSPADM

## 2024-09-26 RX ORDER — PROPOFOL 10 MG/ML
INJECTION, EMULSION INTRAVENOUS
Status: DISCONTINUED | OUTPATIENT
Start: 2024-09-26 | End: 2024-09-26 | Stop reason: SDUPTHER

## 2024-09-26 RX ORDER — SODIUM CHLORIDE 0.9 % (FLUSH) 0.9 %
5-40 SYRINGE (ML) INJECTION PRN
Status: DISCONTINUED | OUTPATIENT
Start: 2024-09-26 | End: 2024-09-26 | Stop reason: HOSPADM

## 2024-09-26 RX ORDER — LABETALOL HYDROCHLORIDE 5 MG/ML
10 INJECTION, SOLUTION INTRAVENOUS
Status: DISCONTINUED | OUTPATIENT
Start: 2024-09-26 | End: 2024-09-26 | Stop reason: HOSPADM

## 2024-09-26 RX ORDER — NALOXONE HYDROCHLORIDE 0.4 MG/ML
INJECTION, SOLUTION INTRAMUSCULAR; INTRAVENOUS; SUBCUTANEOUS PRN
Status: DISCONTINUED | OUTPATIENT
Start: 2024-09-26 | End: 2024-09-26 | Stop reason: HOSPADM

## 2024-09-26 RX ADMIN — LIDOCAINE HYDROCHLORIDE 100 MG: 20 INJECTION, SOLUTION INTRAVENOUS at 07:33

## 2024-09-26 RX ADMIN — PROPOFOL 150 MG: 10 INJECTION, EMULSION INTRAVENOUS at 07:37

## 2024-09-26 RX ADMIN — PROPOFOL 50 MG: 10 INJECTION, EMULSION INTRAVENOUS at 07:33

## 2024-09-26 RX ADMIN — MIDAZOLAM 2 MG: 1 INJECTION INTRAMUSCULAR; INTRAVENOUS at 07:35

## 2024-09-26 RX ADMIN — Medication 30 MG: at 07:38

## 2024-09-26 RX ADMIN — MIDAZOLAM 2 MG: 1 INJECTION INTRAMUSCULAR; INTRAVENOUS at 07:27

## 2024-09-26 RX ADMIN — SODIUM CHLORIDE, POTASSIUM CHLORIDE, SODIUM LACTATE AND CALCIUM CHLORIDE: 600; 310; 30; 20 INJECTION, SOLUTION INTRAVENOUS at 06:11

## 2024-09-26 RX ADMIN — PROPOFOL 50 MG: 10 INJECTION, EMULSION INTRAVENOUS at 07:35

## 2024-09-26 RX ADMIN — PROPOFOL 100 MG: 10 INJECTION, EMULSION INTRAVENOUS at 07:40

## 2024-09-26 ASSESSMENT — PAIN - FUNCTIONAL ASSESSMENT
PAIN_FUNCTIONAL_ASSESSMENT: NONE - DENIES PAIN
PAIN_FUNCTIONAL_ASSESSMENT: 0-10
PAIN_FUNCTIONAL_ASSESSMENT: NONE - DENIES PAIN

## 2024-09-26 ASSESSMENT — PAIN SCALES - GENERAL: PAINLEVEL_OUTOF10: 0

## 2024-09-26 NOTE — PERIOP NOTE
TRANSFER - IN REPORT:    Verbal report received from Nelson Carroll CRNA, RN  on Lulu Benjamin  being received from OR for routine progression of patient care      Report consisted of patient's Situation, Background, Assessment and   Recommendations(SBAR).     Information from the following report(s) Surgery Report, Intake/Output, and MAR was reviewed with the receiving nurse.    Opportunity for questions and clarification was provided.      Assessment completed upon patient's arrival to unit and care assumed.

## 2024-09-26 NOTE — DISCHARGE INSTRUCTIONS
DISCHARGE SUMMARY from Nurse    PATIENT INSTRUCTIONS:    After general anesthesia or intravenous sedation, for 24 hours or while taking prescription Narcotics:  Limit your activities  Do not drive and operate hazardous machinery  Do not make important personal or business decisions  Do  not drink alcoholic beverages  If you have not urinated within 8 hours after discharge, please contact your surgeon on call.    Report the following to your surgeon:  Excessive pain, swelling, redness or odor of or around the surgical area  Temperature over 100.5  Nausea and vomiting lasting longer than 4 hours or if unable to take medications  Any signs of decreased circulation or nerve impairment to extremity: change in color, persistent  numbness, tingling, coldness or increase pain  Any questions    What to do at Home:  Recommended activity: ,   REGULAR DIET  ICE AND ELEVATION OF FOOT  KEEP DRESSING CLEAN AND DRY  AMBULATE FULL WEIGHT BEARING WITH POST OP SHOE  RETURN TO OFFICE AS SCHEDULED    If you experience any of the following symptoms heavy bleeding, fevers, severe pain, circulation changes, please follow up with dr puentes.    *  Please give a list of your current medications to your Primary Care Provider.    *  Please update this list whenever your medications are discontinued, doses are      changed, or new medications (including over-the-counter products) are added.    *  Please carry medication information at all times in case of emergency situations.    These are general instructions for a healthy lifestyle:    No smoking/ No tobacco products/ Avoid exposure to second hand smoke  Surgeon General's Warning:  Quitting smoking now greatly reduces serious risk to your health.    Obesity, smoking, and sedentary lifestyle greatly increases your risk for illness    A healthy diet, regular physical exercise & weight monitoring are important for maintaining a healthy lifestyle    You may be retaining fluid if you have a  fluids. Choose water and other clear liquids until you feel better. If you have kidney, heart, or liver disease and have to limit fluids, talk with your doctor before you increase the amount of fluids you drink.  When you are able to eat, try clear soups, mild foods, and liquids until all symptoms are gone for 12 to 48 hours. Other good choices include dry toast, crackers, cooked cereal, and gelatin dessert, such as Jell-O.  Do not smoke. Smoking and being around smoke can make nausea worse.   When should you call for help?    Call your doctor now or seek immediate medical care if:    You have new or worse nausea or vomiting.     You are too sick to your stomach to drink any fluids.     You cannot keep down fluids.     You have symptoms of dehydration, such as:  Dry eyes and a dry mouth.  Passing only a little urine.  Feeling thirstier than usual.     You are dizzy or lightheaded, or you feel like you may faint.           Infection After Surgery: Care Instructions  Overview  After surgery, an infection is always possible. It doesn't mean that the surgery didn't go well.  How can you care for yourself at home?  Make sure your surgeon knows about the infection, especially if you saw another doctor about your symptoms.  If your doctor prescribed antibiotics, take them as directed. Do not stop taking them just because you feel better. You need to take the full course of antibiotics.  Keep the skin clean and dry.  You may have a bandage over the cut (incision). A bandage helps the incision heal and protects it. Your doctor will tell you how to take care of this. Keep it clean and dry. You may have drainage from the wound.     Call your doctor now or seek immediate medical care if:    You have signs of an infection such as:  Increased pain, swelling, warmth, or redness in the area.  Red streaks leading from the area.  Pus draining from the wound.  A new or higher fever.       Bleeding After Surgery: Care

## 2024-09-26 NOTE — PERIOP NOTE
TRANSFER - IN REPORT:    Verbal report received from Marie phelps on Lulu Benjamin  being received from [facility] for routine post-op      Report consisted of patient's Situation, Background, Assessment and   Recommendations(SBAR).     Information from the following report(s) Nurse Handoff Report was reviewed with the receiving nurse.    Opportunity for questions and clarification was provided.      Assessment completed upon patient's arrival to unit and care assumed.

## 2024-09-26 NOTE — H&P
Progress Note      Patient: Lulu Benjamin               Sex: female          DOA: 9/26/2024       YOB: 1995      Age:  29 y.o.        LOS:  LOS: 0 days               HPI:  Lulu Benjamin is a 29 y.o. female  who is here today for surgical management of ingrowing nail and exostosis of the left great toe.           Active Problems:    * No active hospital problems. *  Resolved Problems:    * No resolved hospital problems. *    Past Medical History:   Diagnosis Date    Allergic rhinitis 2017    Last Assessment & Plan:  I resumed her generic Singulair 10 mg daily.  Stop the Allegra and start Zyrtec 10 mg 1 pill every day.  Refer to ENT through [facility] for evaluation.  She will keep her appointment here with March with her PCP to recheck her allergy symptoms as well.    Depression 2012    on meds. see theraprist/counselor PRN    Exposure to phentermine 09/2024    pt takes Phentermine for weight management.    ANDER (generalized anxiety disorder) 2020    Buspirone BID    H/O Kawasaki's disease 2017    diagnosed at age 5. no specialist as of 9/23/24    Ingrown toenail of both feet 2020    many treatments in past with chemical tx.    Mild intermittent asthma without complication 2020    pt denies  \"it was subglotic stenosis minic symptoms of asthma\"    Morbid obesity (HCC) 09/2024    BMI  ~ 45. Takes Phentermine    PONV (postoperative nausea and vomiting)     hx N&V. used scopolamine    Subglottic stenosis 2021    hx. trachael dilation annually. sees ENT in [place] Dr. Bruna Carrillo    Wears prescription eyeglasses      Past Surgical History:   Procedure Laterality Date    BUNIONECTOMY Right 03/19/2024    right great toe condylectomy, excision of nail and nail matrix -Dr. Simpson    LARYNGOSCOPY  2022 2023 & 2024. tracheal dilation     History reviewed. No pertinent family history.  Social History     Socioeconomic History    Marital status: Single     Spouse name: Not on file    Number

## 2024-09-26 NOTE — PERIOP NOTE
Reviewed PTA medication list with patient/caregiver and patient/caregiver denies any additional medications.     Patient admits to having a responsible adult care for them at home for at least 24 hours after surgery.    Patient encouraged to use gown warming system and informed that using said warming gown to regulate body temperature prior to a procedure has been shown to help reduce the risks of blood clots and infection.    Patient's pharmacy of choice verified and documented in PTA medication section.    Dual skin assessment & fall risk band verification completed with kostas mitchell RN.

## 2024-09-26 NOTE — BRIEF OP NOTE
Brief Postoperative Note      Patient: Lulu Benjamin  YOB: 1995  MRN: [id number]    Date of Procedure: 9/26/2024    Pre-Op Diagnosis Codes:      * Ingrowing nail [L60.0]     * Enthesopathy of left ankle [M77.52]    Post-Op Diagnosis: Same       Procedure(s):  LEFT GREAT TOE CONDYLECTOMY, EXCISION OF NAIL AND NAIL MATRIX (NOT A GOOD PREFERENCE CARD)    Surgeon(s):  Pablo Rico DPM    Assistant:  Surgical Assistant: Jameel Blandon    Anesthesia: Monitor Anesthesia Care    Estimated Blood Loss (mL): Minimal    Complications: None    Specimens:   * No specimens in log *    Implants:  * No implants in log *      Drains: * No LDAs found *    Findings:  Infection Present At Time Of Surgery (PATOS) (choose all levels that have infection present):  No infection present  Other Findings: None    Electronically signed by PABLO RICO DPM on 9/26/2024 at 7:16 AM

## 2024-09-26 NOTE — PERIOP NOTE
TRANSFER - OUT REPORT:    Verbal report given to Delilah CEDILLO  on Lulu Benjamin  being transferred to Phase II  for routine progression of patient care       Report consisted of patient's Situation, Background, Assessment and   Recommendations(SBAR).     Information from the following report(s) Adult Overview, Surgery Report, Intake/Output, and MAR was reviewed with the receiving nurse.           Lines:   Peripheral IV 09/26/24 Left;Posterior Forearm (Active)   Site Assessment Clean, dry & intact 09/26/24 0801   Line Status Infusing 09/26/24 0801   Line Care Connections checked and tightened 09/26/24 0801   Phlebitis Assessment No symptoms 09/26/24 0801   Infiltration Assessment 0 09/26/24 0801   Alcohol Cap Used No 09/26/24 0611   Dressing Status Clean, dry & intact 09/26/24 0801   Dressing Type Transparent 09/26/24 0756        Opportunity for questions and clarification was provided.      Patient transported with:  Registered Nurse

## 2024-09-27 NOTE — OP NOTE
[address]  [address]                            OPERATIVE REPORT      PATIENT NAME: TOO GUADARRAMA             : 1995  MED REC NO: [id number]                       ROOM: OR  ACCOUNT NO: [id number]                       ADMIT DATE: 2024  PROVIDER: Ismael Simpson DPM    DATE OF SERVICE:  2024    PREOPERATIVE DIAGNOSES:       1. Painful ingrowing nail, left.     2. Painful condyle, exostosis, left great toe.    POSTOPERATIVE DIAGNOSES:       1. Painful ingrowing nail, left.     2. Painful condyle, exostosis, left great toe.    PROCEDURES PERFORMED:       1. Excisional matrixectomy, left great toe.     2. Exostectomy, partial condylectomy, left great toe.    SURGEON:  Ismael Simpson DPM    ASSISTANT:  ***    ANESTHESIA:  MAC.    ESTIMATED BLOOD LOSS:  ***    SPECIMENS REMOVED:  ***    INTRAOPERATIVE FINDINGS:  ***     COMPLICATIONS:  ***    IMPLANTS:  ***    INDICATIONS:  This is a very pleasant 29-year-old female who has ongoing pain and discomfort associated with a recurrent ingrowing toenail associated with the lateral border of the great toe in addition to an underlying hypertrophic condyle.  After careful consideration of risks, benefits, and alternatives, we have decided to move forward with surgical repair.  Complications include delayed healing, nonhealing, continued pain, discomfort.  No guarantees were given and informed consent was obtained.    DESCRIPTION OF PROCEDURE:  The patient was taken to the operating room and placed on the operating table in a supine position.  After induction of IV sedation and administration of local anesthetic consisting of 10 mL of 0.5% Marcaine plain, the left foot was then prepped and draped in usual sterile manner.  The foot was exsanguinated with the use of an Esmarch bandage and the pneumatic ankle tourniquet was then inflated to 250 mmHg.    Procedure #1:  Excision of

## (undated) DEVICE — BANDAGE,GAUZE,CONFORMING,4"X75",STRL,LF: Brand: MEDLINE

## (undated) DEVICE — SUTURE VCRL + SZ 2-0 L27IN ABSRB UD CT-2 L26MM 1/2 CIR TAPR VCP269H

## (undated) DEVICE — MASTISOL ADHESIVE LIQ 2/3ML

## (undated) DEVICE — BLADE,CARBON-STEEL,15,STRL,DISPOSABLE,TB: Brand: MEDLINE

## (undated) DEVICE — BANDAGE,GAUZE,CONFORMING,1"X75",STRL,LF: Brand: MEDLINE

## (undated) DEVICE — BANDAGE,GAUZE,BULKEE II,4.5"X4.1YD,STRL: Brand: MEDLINE

## (undated) DEVICE — ZIMMER® STERILE DISPOSABLE TOURNIQUET CUFF WITH PROTECTIVE SLEEVE AND PLC, SINGLE PORT, SINGLE BLADDER, 34 IN. (86 CM)

## (undated) DEVICE — DRESSING,GAUZE,XEROFORM,CURAD,1"X8",ST: Brand: CURAD

## (undated) DEVICE — APPLICATOR MEDICATED 26 CC SOLUTION HI LT ORNG CHLORAPREP

## (undated) DEVICE — PACK PROCEDURE SURG EXTREMITY CUST

## (undated) DEVICE — NEEDLE HYPO 18GA L1.5IN PNK POLYPR HUB S STL REG BVL STR

## (undated) DEVICE — STRIP SKIN CLSR W0.25XL4IN WHT SPUNBOUND FBR NYL HI ADH

## (undated) DEVICE — GLOVE ORTHO 8   MSG9480

## (undated) DEVICE — Device

## (undated) DEVICE — SUTURE ETHILON SZ 3-0 L18IN NONABSORBABLE BLK PS-2 L19MM 3/8 1669H

## (undated) DEVICE — SUTURE PROL SZ 3-0 L18IN NONABSORBABLE BLU L19MM PC-5 3/8 8632G

## (undated) DEVICE — SPONGE LAP W18XL18IN WHT COT 4 PLY FLD STRUNG RADPQ DISP ST 2 PER PACK

## (undated) DEVICE — SUTURE VCRL + SZ 3-0 L27IN ABSRB UD CT-2 L26MM 1/2 CIR TAPR VCP232H

## (undated) DEVICE — GARMENT,MEDLINE,DVT,INT,CALF,MED, GEN2: Brand: MEDLINE

## (undated) DEVICE — PRECISION (9.0 X 0.51 X 25.0MM)

## (undated) DEVICE — ZIMMER® STERILE DISPOSABLE TOURNIQUET CUFF WITH PROTECTIVE SLEEVE AND PLC, SINGLE PORT, SINGLE BLADDER, 18 IN. (46 CM)

## (undated) DEVICE — SPONGE LAPAROTOMY W18XL18IN WHITE STRUNG RADIOPAQUE STERILE